# Patient Record
Sex: FEMALE | Race: WHITE | NOT HISPANIC OR LATINO | Employment: UNEMPLOYED | URBAN - METROPOLITAN AREA
[De-identification: names, ages, dates, MRNs, and addresses within clinical notes are randomized per-mention and may not be internally consistent; named-entity substitution may affect disease eponyms.]

---

## 2018-01-29 DIAGNOSIS — K74.60 UNSPECIFIED CIRRHOSIS OF LIVER: Primary | ICD-10-CM

## 2018-01-29 DIAGNOSIS — Z12.31 VISIT FOR SCREENING MAMMOGRAM: ICD-10-CM

## 2018-02-06 ENCOUNTER — HOSPITAL ENCOUNTER (OUTPATIENT)
Dept: RADIOLOGY | Facility: HOSPITAL | Age: 58
Discharge: HOME | End: 2018-02-06
Attending: GENERAL PRACTICE
Payer: MEDICAID

## 2018-02-06 VITALS — HEIGHT: 62 IN | BODY MASS INDEX: 21.35 KG/M2 | WEIGHT: 116 LBS

## 2018-02-06 DIAGNOSIS — K74.60 UNSPECIFIED CIRRHOSIS OF LIVER: ICD-10-CM

## 2018-02-06 DIAGNOSIS — Z12.31 VISIT FOR SCREENING MAMMOGRAM: ICD-10-CM

## 2018-02-06 PROCEDURE — 77067 SCR MAMMO BI INCL CAD: CPT | Mod: TC

## 2018-02-06 PROCEDURE — 76705 ECHO EXAM OF ABDOMEN: CPT | Mod: 26,,, | Performed by: RADIOLOGY

## 2018-02-06 PROCEDURE — 76705 ECHO EXAM OF ABDOMEN: CPT | Mod: TC

## 2018-02-06 PROCEDURE — 77067 SCR MAMMO BI INCL CAD: CPT | Mod: 26,,, | Performed by: RADIOLOGY

## 2018-02-06 PROCEDURE — 77063 BREAST TOMOSYNTHESIS BI: CPT | Mod: 26,,, | Performed by: RADIOLOGY

## 2018-02-21 DIAGNOSIS — J44.9 CHRONIC OBSTRUCTIVE PULMONARY DISEASE (COPD): ICD-10-CM

## 2018-02-21 DIAGNOSIS — M81.0 AGE-RELATED OSTEOPOROSIS WITHOUT CURRENT PATHOLOGICAL FRACTURE: Primary | ICD-10-CM

## 2018-04-24 ENCOUNTER — HOSPITAL ENCOUNTER (OUTPATIENT)
Dept: RADIOLOGY | Facility: HOSPITAL | Age: 58
Discharge: HOME | End: 2018-04-24
Attending: GENERAL PRACTICE
Payer: MEDICAID

## 2018-04-24 ENCOUNTER — HOSPITAL ENCOUNTER (OUTPATIENT)
Dept: RESPIRATORY THERAPY | Facility: HOSPITAL | Age: 58
Discharge: HOME | End: 2018-04-24
Attending: GENERAL PRACTICE
Payer: MEDICAID

## 2018-04-24 DIAGNOSIS — J44.9 CHRONIC OBSTRUCTIVE PULMONARY DISEASE (COPD): ICD-10-CM

## 2018-04-24 DIAGNOSIS — M81.0 AGE-RELATED OSTEOPOROSIS WITHOUT CURRENT PATHOLOGICAL FRACTURE: ICD-10-CM

## 2018-04-24 PROCEDURE — 94729 DIFFUSING CAPACITY: CPT

## 2018-04-24 PROCEDURE — 77080 DXA BONE DENSITY AXIAL: CPT | Mod: 26,,, | Performed by: RADIOLOGY

## 2018-04-24 PROCEDURE — 94010 BREATHING CAPACITY TEST: CPT

## 2018-04-24 PROCEDURE — 94727 GAS DIL/WSHOT DETER LNG VOL: CPT

## 2018-04-24 PROCEDURE — 77080 DXA BONE DENSITY AXIAL: CPT | Mod: TC

## 2018-09-20 ENCOUNTER — OFFICE VISIT (OUTPATIENT)
Dept: OBSTETRICS AND GYNECOLOGY | Facility: CLINIC | Age: 58
End: 2018-09-20
Payer: MEDICAID

## 2018-09-20 VITALS
HEIGHT: 62 IN | WEIGHT: 135.06 LBS | BODY MASS INDEX: 24.85 KG/M2 | SYSTOLIC BLOOD PRESSURE: 110 MMHG | DIASTOLIC BLOOD PRESSURE: 62 MMHG

## 2018-09-20 DIAGNOSIS — J44.9 CHRONIC OBSTRUCTIVE PULMONARY DISEASE, UNSPECIFIED COPD TYPE: ICD-10-CM

## 2018-09-20 DIAGNOSIS — N95.1 MENOPAUSAL STATE: ICD-10-CM

## 2018-09-20 DIAGNOSIS — Z01.419 ENCOUNTER FOR GYNECOLOGICAL EXAMINATION WITHOUT ABNORMAL FINDING: ICD-10-CM

## 2018-09-20 DIAGNOSIS — R87.619 ABNORMAL CERVICAL PAPANICOLAOU SMEAR, UNSPECIFIED ABNORMAL PAP FINDING: ICD-10-CM

## 2018-09-20 DIAGNOSIS — Z00.00 ANNUAL PHYSICAL EXAM: Primary | ICD-10-CM

## 2018-09-20 PROCEDURE — 99213 OFFICE O/P EST LOW 20 MIN: CPT | Mod: PBBFAC | Performed by: OBSTETRICS & GYNECOLOGY

## 2018-09-20 PROCEDURE — 99999 PR PBB SHADOW E&M-EST. PATIENT-LVL III: CPT | Mod: PBBFAC,,, | Performed by: OBSTETRICS & GYNECOLOGY

## 2018-09-20 PROCEDURE — 99386 PREV VISIT NEW AGE 40-64: CPT | Mod: S$PBB,,, | Performed by: OBSTETRICS & GYNECOLOGY

## 2018-09-20 PROCEDURE — 88175 CYTOPATH C/V AUTO FLUID REDO: CPT

## 2018-09-20 NOTE — PATIENT INSTRUCTIONS

## 2018-09-20 NOTE — PROGRESS NOTES
Subjective:      Chief Complaint:  ABN  PAP  Chief Complaint   Patient presents with    Well Woman       Menstrual History:    OB History      Para Term  AB Living    4 4 4          SAB TAB Ectopic Multiple Live Births                       Menarche age: 12     No LMP recorded. Patient is postmenopausal.                Objective:        History of Present Illness AND  Examination detailed DICTATE:    PRESENT ILLNESS AND  NOTE:  The patient is 58 years of age, new patient to   [facility], new patient to me.  The patient is  4, para 4.  Pap smear   in 2018, abnormal.  Bone density, osteoporosis.  Mammogram is negative.  The   patient's medical, abnormal Pap smears, COPD, osteoporosis, cholelithiasis.    Surgery, none.  The patient is also complaining of right lower quadrant pain   with bending and increased activity.  This has been going on for weeks.    PHYSICAL EXAMINATION:  VITAL SIGNS:  Blood pressure 110/62, weight 135.  BREASTS:  There is no mass, lump, discharge, skin changes, retractions.  PELVIC:  External normal.  Vulva normal.  B[place], urethral and [place] glands   are negative.  Vagina is clear.  Cervix is mild ectropion.  Small uterus.  Ovary   is barely palpable.  The patient is not on any estrogen replacement therapy.    Good pelvic support.  No pain elicited on examination.  Pain might be due to the   cholelithiasis.  I did not find any abnormality on pelvic examination.  RECTAL:  Negative.    PLAN:  Repeat Pap smear.  Continue with multivitamin, calcium, vitamin D, and we   will see the patient back after the Pap smear has returned.    IMPRESSION:  Possible pain either due to gallstones or diverticular problem   since on pelvic exam I could not elicit any discomfort or pain and there is no   enlargement.      JAIDEN  dd: 2018 09:18:12 (CDT)  td: 2018 23:29:04 (CDT)  Doc ID   #[id number]  Job ID #[id number]    CC:         Physical Exam   Constitutional: She is  oriented to person, place, and time. She appears well-developed and well-nourished. No distress.   HENT:   Head: Normocephal.  Eyes: Pupils are equal, round, and reactive to light.   Neck: Neck supple. No tracheal deviation present.   Cardiovascular: Normal rate, regular rhythm and normal heart sounds. No murmur heard.  Pulmonary/Chest: COPD  Abdominal: Bowel sounds are normal. She exhibits no distension and no mass. There is no tenderness. There is no rebound and no guarding.  Musculoskeletal: Normal range of motion.   Lymphadenopathy:        Right: No inguinal adenopathy present.        Left: No inguinal adenopathy present.   Skin: Skin is warm. No rash noted.        Review of Systems  Review of Systems   Normal ROS:   Constitutional: Negative for fever, chills, activity change fatigue and unexpected weight change.   HENT: Negative for nosebleeds, congestion.  Eyes: Negative for visual disturbance.   Respiratory: Negative for shortness of breath and wheezing.    Cardiovascular: Negative for chest pain, palpitations.   Gastrointestinal: Negative for abdominal pain, diarrhea, constipation, blood in stool and abdominal distention.   Musculoskeletal: Negative for back pain.   Allergic/Immunologic: Negative.   Neurological: Negative    Hematological: Negative f   Psychiatric/Behavioral: Negative     Assessment:      DiagnosisMENOPAUSAL    ABN   PAP    COPD:       Plan:      Return in 2  weeks

## 2018-09-27 ENCOUNTER — TELEPHONE (OUTPATIENT)
Dept: OBSTETRICS AND GYNECOLOGY | Facility: CLINIC | Age: 58
End: 2018-09-27

## 2018-09-27 NOTE — TELEPHONE ENCOUNTER
9/27/18 @ 1630 (TERESA)  INFORMED PT TO KEEP  HER APPT W/ DR LINDSAY ON 10/4/18...  Elenita Tran I Staff   Caller: Self (Today,  3:16 PM)             Pt is calling to see if she has to come to her appt on 10/04 since her test results came back ok. Please call pt at [phone]

## 2018-10-04 ENCOUNTER — OFFICE VISIT (OUTPATIENT)
Dept: OBSTETRICS AND GYNECOLOGY | Facility: CLINIC | Age: 58
End: 2018-10-04
Payer: MEDICAID

## 2018-10-04 VITALS
BODY MASS INDEX: 24.76 KG/M2 | SYSTOLIC BLOOD PRESSURE: 122 MMHG | HEIGHT: 62 IN | WEIGHT: 134.56 LBS | DIASTOLIC BLOOD PRESSURE: 84 MMHG

## 2018-10-04 DIAGNOSIS — N95.1 MENOPAUSAL STATE: Primary | ICD-10-CM

## 2018-10-04 DIAGNOSIS — K70.30 ALCOHOLIC CIRRHOSIS, UNSPECIFIED WHETHER ASCITES PRESENT: ICD-10-CM

## 2018-10-04 DIAGNOSIS — M81.0 OSTEOPOROSIS, UNSPECIFIED OSTEOPOROSIS TYPE, UNSPECIFIED PATHOLOGICAL FRACTURE PRESENCE: ICD-10-CM

## 2018-10-04 PROCEDURE — 99212 OFFICE O/P EST SF 10 MIN: CPT | Mod: S$PBB,,, | Performed by: OBSTETRICS & GYNECOLOGY

## 2018-10-04 PROCEDURE — 99999 PR PBB SHADOW E&M-EST. PATIENT-LVL III: CPT | Mod: PBBFAC,,, | Performed by: OBSTETRICS & GYNECOLOGY

## 2018-10-04 PROCEDURE — 99213 OFFICE O/P EST LOW 20 MIN: CPT | Mod: PBBFAC | Performed by: OBSTETRICS & GYNECOLOGY

## 2018-10-04 RX ORDER — DULOXETIN HYDROCHLORIDE 60 MG/1
60 CAPSULE, DELAYED RELEASE ORAL DAILY
Refills: 3 | COMMUNITY
Start: 2018-09-27

## 2018-10-04 RX ORDER — FERROUS SULFATE, DRIED 160(50) MG
1 TABLET, EXTENDED RELEASE ORAL 2 TIMES DAILY WITH MEALS
COMMUNITY

## 2018-10-04 RX ORDER — OMEPRAZOLE 20 MG/1
20 CAPSULE, DELAYED RELEASE ORAL EVERY MORNING
Refills: 3 | COMMUNITY
Start: 2018-09-12

## 2018-10-04 RX ORDER — ALENDRONATE SODIUM 10 MG/1
10 TABLET ORAL DAILY
Refills: 3 | COMMUNITY
Start: 2018-09-27

## 2018-10-04 RX ORDER — TRAZODONE HYDROCHLORIDE 50 MG/1
50 TABLET ORAL NIGHTLY
Refills: 3 | COMMUNITY
Start: 2018-09-12

## 2018-10-04 RX ORDER — UMECLIDINIUM BROMIDE AND VILANTEROL TRIFENATATE 62.5; 25 UG/1; UG/1
POWDER RESPIRATORY (INHALATION)
Refills: 5 | COMMUNITY
Start: 2018-09-12

## 2018-10-04 RX ORDER — VITAMIN A 3000 MCG
10000 CAPSULE ORAL DAILY
COMMUNITY

## 2018-10-04 RX ORDER — CYCLOBENZAPRINE HCL 10 MG
10 TABLET ORAL 3 TIMES DAILY PRN
Refills: 0 | COMMUNITY
Start: 2018-09-12

## 2018-10-04 RX ORDER — GABAPENTIN 600 MG/1
600 TABLET ORAL 3 TIMES DAILY
Refills: 3 | COMMUNITY
Start: 2018-09-12

## 2018-10-04 NOTE — PROGRESS NOTES
Subjective:      Chief Complaint:  Menopausal   OSTEOPOROSIS    Menstrual History:    OB History      Para Term  AB Living    4 4 4          SAB TAB Ectopic Multiple Live Births                       Menarche age: 12     No LMP recorded. Patient is postmenopausal.       The patient is 58 years of age,  4, para 4, was here in early September   for routine annual examination.  The patient had a past history of abnormal Pap   smear.  Past problem list, medical history, surgical history reviewed with the   patient.  The patient has gallstones, not on any estrogen replacement therapy.    Repeat Pap smear was negative.  Mammogram negative; however, the patient has   osteoporosis.  Presently taking alendronate.  I would like to add a low-dose   estrogen Premarin 0.3 mg every second day.  Then, repeat the bone density in   about a year.  Discussed the risk of alendronate and estrogen together,   increased clots, possible strokes and heart problems.  However, fracturing the   hip and surgery also carries a great risk.  So, we will start the patient on   Premarin 0.3 mg every other day and repeat bone density in one year.      JAIDEN  dd: 10/04/2018 08:48:51 (CDT)  td: 10/05/2018 00:08:26 (CDT)  Doc ID   #[id number]  Job ID #[id number]    CC:          Objective:        History of Present Illness AND  Examination detailed DICTATE:             Assessment:      Diagnosis: MENOPAUSAL    OSTEOPOROSIS    CIRRHOSIS     Plan:      Return in 3  months

## 2019-01-07 ENCOUNTER — OFFICE VISIT (OUTPATIENT)
Dept: OBSTETRICS AND GYNECOLOGY | Facility: CLINIC | Age: 59
End: 2019-01-07
Payer: MEDICAID

## 2019-01-07 VITALS
DIASTOLIC BLOOD PRESSURE: 80 MMHG | WEIGHT: 139.13 LBS | HEIGHT: 62 IN | BODY MASS INDEX: 25.6 KG/M2 | SYSTOLIC BLOOD PRESSURE: 122 MMHG

## 2019-01-07 DIAGNOSIS — N95.1 MENOPAUSAL STATE: Primary | ICD-10-CM

## 2019-01-07 PROCEDURE — 99212 OFFICE O/P EST SF 10 MIN: CPT | Mod: S$PBB,,, | Performed by: OBSTETRICS & GYNECOLOGY

## 2019-01-07 PROCEDURE — 99213 OFFICE O/P EST LOW 20 MIN: CPT | Mod: PBBFAC | Performed by: OBSTETRICS & GYNECOLOGY

## 2019-01-07 PROCEDURE — 99212 PR OFFICE/OUTPT VISIT, EST, LEVL II, 10-19 MIN: ICD-10-PCS | Mod: S$PBB,,, | Performed by: OBSTETRICS & GYNECOLOGY

## 2019-01-07 PROCEDURE — 99999 PR PBB SHADOW E&M-EST. PATIENT-LVL III: CPT | Mod: PBBFAC,,, | Performed by: OBSTETRICS & GYNECOLOGY

## 2019-01-07 PROCEDURE — 99999 PR PBB SHADOW E&M-EST. PATIENT-LVL III: ICD-10-PCS | Mod: PBBFAC,,, | Performed by: OBSTETRICS & GYNECOLOGY

## 2019-01-07 NOTE — PROGRESS NOTES
Subjective:      Chief Complaint:    Chief Complaint   Patient presents with    Follow-up       Menstrual History:    OB History      Para Term  AB Living    4 4 4          SAB TAB Ectopic Multiple Live Births                       Menarche age: 12     No LMP recorded. Patient is postmenopausal.            Objective:        History of Present Illness AND  Examination detailed DICTATE:     The patient is a 58-year-old lady who was here approximately three weeks ago for   yearly examination.  The patient was referred from her primary care because of   abnormal Pap smear.  She is a  4, para 4.  Past medical history and   problem list reviewed.  The patient had received the benefit of repeat Pap   smear, which was entirely within normal limit.  The patient was reassured that   her Pap smear is normal.  We do not have to be concerned about it.  We will see   her back on her regular visit within a year.      JAIDEN  dd: 2019 14:24:36 (CST)  td: 2019 03:24:46 (CST)  Doc ID   #[id number]  Job ID #[id number]    CC:             Assessment:      Diagnosis:menopausal          Plan:      Return in 12  months

## 2019-04-05 DIAGNOSIS — Z12.39 SCREENING BREAST EXAMINATION: Primary | ICD-10-CM

## 2019-04-15 ENCOUNTER — HOSPITAL ENCOUNTER (OUTPATIENT)
Dept: RADIOLOGY | Facility: HOSPITAL | Age: 59
Discharge: HOME | End: 2019-04-15
Attending: GENERAL PRACTICE
Payer: MEDICAID

## 2019-04-15 DIAGNOSIS — Z12.39 SCREENING BREAST EXAMINATION: ICD-10-CM

## 2019-04-15 PROCEDURE — 77063 MAMMO DIGITAL SCREENING BILAT WITH TOMOSYNTHESIS_CAD: ICD-10-PCS | Mod: 26,,, | Performed by: RADIOLOGY

## 2019-04-15 PROCEDURE — 77067 MAMMO DIGITAL SCREENING BILAT WITH TOMOSYNTHESIS_CAD: ICD-10-PCS | Mod: 26,,, | Performed by: RADIOLOGY

## 2019-04-15 PROCEDURE — 77063 BREAST TOMOSYNTHESIS BI: CPT | Mod: 26,,, | Performed by: RADIOLOGY

## 2019-04-15 PROCEDURE — 77067 SCR MAMMO BI INCL CAD: CPT | Mod: TC

## 2019-04-15 PROCEDURE — 77067 SCR MAMMO BI INCL CAD: CPT | Mod: 26,,, | Performed by: RADIOLOGY

## 2019-11-08 DIAGNOSIS — R19.00 INTRA-ABDOMINAL AND PELVIC SWELLING, MASS AND LUMP, UNSPECIFIED SITE: Primary | ICD-10-CM

## 2019-11-14 ENCOUNTER — HOSPITAL ENCOUNTER (OUTPATIENT)
Dept: RADIOLOGY | Facility: HOSPITAL | Age: 59
Discharge: HOME | End: 2019-11-14
Attending: GENERAL PRACTICE
Payer: MEDICAID

## 2019-11-14 DIAGNOSIS — R19.00 INTRA-ABDOMINAL AND PELVIC SWELLING, MASS AND LUMP, UNSPECIFIED SITE: ICD-10-CM

## 2019-11-14 PROCEDURE — 76705 ECHO EXAM OF ABDOMEN: CPT | Mod: TC

## 2019-11-14 PROCEDURE — 76705 ECHO EXAM OF ABDOMEN: CPT | Mod: 26,,, | Performed by: RADIOLOGY

## 2019-11-14 PROCEDURE — 76705 US ABDOMEN LIMITED: ICD-10-PCS | Mod: 26,,, | Performed by: RADIOLOGY

## 2020-06-02 DIAGNOSIS — Z12.31 ENCOUNTER FOR SCREENING MAMMOGRAM FOR MALIGNANT NEOPLASM OF BREAST: Primary | ICD-10-CM

## 2020-06-11 ENCOUNTER — HOSPITAL ENCOUNTER (OUTPATIENT)
Dept: RADIOLOGY | Facility: HOSPITAL | Age: 60
Discharge: HOME | End: 2020-06-11
Attending: GENERAL PRACTICE
Payer: MEDICAID

## 2020-06-11 VITALS — WEIGHT: 139 LBS | HEIGHT: 62 IN | BODY MASS INDEX: 25.58 KG/M2

## 2020-06-11 DIAGNOSIS — Z12.31 ENCOUNTER FOR SCREENING MAMMOGRAM FOR MALIGNANT NEOPLASM OF BREAST: ICD-10-CM

## 2020-06-24 ENCOUNTER — HOSPITAL ENCOUNTER (OUTPATIENT)
Dept: RADIOLOGY | Facility: HOSPITAL | Age: 60
Discharge: HOME | End: 2020-06-24
Attending: GENERAL PRACTICE
Payer: MEDICAID

## 2020-06-24 VITALS — BODY MASS INDEX: 25.58 KG/M2 | HEIGHT: 62 IN | WEIGHT: 139 LBS

## 2020-06-24 DIAGNOSIS — N64.52 NIPPLE DISCHARGE IN FEMALE: ICD-10-CM

## 2020-06-24 DIAGNOSIS — N64.4 BREAST PAIN IN FEMALE: ICD-10-CM

## 2020-06-24 PROCEDURE — 77066 DX MAMMO INCL CAD BI: CPT | Mod: 26,,, | Performed by: RADIOLOGY

## 2020-06-24 PROCEDURE — 77062 MAMMO DIGITAL DIAGNOSTIC BILAT WITH TOMOSYNTHESIS_CAD: ICD-10-PCS | Mod: 26,,, | Performed by: RADIOLOGY

## 2020-06-24 PROCEDURE — 77066 DX MAMMO INCL CAD BI: CPT | Mod: TC

## 2020-06-24 PROCEDURE — 77062 BREAST TOMOSYNTHESIS BI: CPT | Mod: 26,,, | Performed by: RADIOLOGY

## 2020-06-24 PROCEDURE — 77066 MAMMO DIGITAL DIAGNOSTIC BILAT WITH TOMOSYNTHESIS_CAD: ICD-10-PCS | Mod: 26,,, | Performed by: RADIOLOGY

## 2020-11-19 DIAGNOSIS — K74.60 UNSPECIFIED CIRRHOSIS OF LIVER: ICD-10-CM

## 2020-11-19 DIAGNOSIS — M81.0 AGE-RELATED OSTEOPOROSIS WITHOUT CURRENT PATHOLOGICAL FRACTURE: Primary | ICD-10-CM

## 2020-12-01 ENCOUNTER — HOSPITAL ENCOUNTER (OUTPATIENT)
Dept: RADIOLOGY | Facility: HOSPITAL | Age: 60
Discharge: HOME | End: 2020-12-01
Attending: NURSE PRACTITIONER
Payer: MEDICAID

## 2020-12-01 DIAGNOSIS — K74.60 UNSPECIFIED CIRRHOSIS OF LIVER: ICD-10-CM

## 2020-12-01 PROCEDURE — 76705 US ABDOMEN LIMITED: ICD-10-PCS | Mod: 26,,, | Performed by: RADIOLOGY

## 2020-12-01 PROCEDURE — 76705 ECHO EXAM OF ABDOMEN: CPT | Mod: TC

## 2020-12-01 PROCEDURE — 76705 ECHO EXAM OF ABDOMEN: CPT | Mod: 26,,, | Performed by: RADIOLOGY

## 2020-12-22 ENCOUNTER — HOSPITAL ENCOUNTER (OUTPATIENT)
Dept: RADIOLOGY | Facility: HOSPITAL | Age: 60
Discharge: HOME | End: 2020-12-22
Attending: GENERAL PRACTICE
Payer: MEDICAID

## 2020-12-22 DIAGNOSIS — Z76.89 PERSONS ENCOUNTERING HEALTH SERVICES IN OTHER SPECIFIED CIRCUMSTANCES: ICD-10-CM

## 2020-12-22 PROCEDURE — 71045 X-RAY EXAM CHEST 1 VIEW: CPT | Mod: TC,FY

## 2020-12-22 PROCEDURE — 71045 XR CHEST 1 VIEW: ICD-10-PCS | Mod: 26,,, | Performed by: RADIOLOGY

## 2020-12-22 PROCEDURE — 71045 X-RAY EXAM CHEST 1 VIEW: CPT | Mod: 26,,, | Performed by: RADIOLOGY

## 2022-01-07 DIAGNOSIS — M81.0 AGE-RELATED OSTEOPOROSIS WITHOUT CURRENT PATHOLOGICAL FRACTURE: Primary | ICD-10-CM

## 2022-01-21 ENCOUNTER — HOSPITAL ENCOUNTER (OUTPATIENT)
Dept: RADIOLOGY | Facility: CLINIC | Age: 62
Discharge: HOME | End: 2022-01-21
Attending: GENERAL PRACTICE
Payer: MEDICAID

## 2022-01-21 DIAGNOSIS — M81.0 AGE-RELATED OSTEOPOROSIS WITHOUT CURRENT PATHOLOGICAL FRACTURE: ICD-10-CM

## 2022-01-21 PROCEDURE — 77080 DEXA BONE DENSITY SPINE HIP: ICD-10-PCS | Mod: 26,,, | Performed by: INTERNAL MEDICINE

## 2022-01-21 PROCEDURE — 77080 DXA BONE DENSITY AXIAL: CPT | Mod: 26,,, | Performed by: INTERNAL MEDICINE

## 2022-01-21 PROCEDURE — 77080 DXA BONE DENSITY AXIAL: CPT | Mod: TC,PO

## 2022-02-01 DIAGNOSIS — Z12.31 ENCOUNTER FOR SCREENING MAMMOGRAM FOR MALIGNANT NEOPLASM OF BREAST: Primary | ICD-10-CM

## 2022-02-08 ENCOUNTER — HOSPITAL ENCOUNTER (OUTPATIENT)
Dept: RADIOLOGY | Facility: HOSPITAL | Age: 62
Discharge: HOME | End: 2022-02-08
Attending: NURSE PRACTITIONER
Payer: MEDICAID

## 2022-02-08 VITALS — WEIGHT: 139 LBS | HEIGHT: 62 IN | BODY MASS INDEX: 25.58 KG/M2

## 2022-02-08 DIAGNOSIS — Z12.31 ENCOUNTER FOR SCREENING MAMMOGRAM FOR MALIGNANT NEOPLASM OF BREAST: ICD-10-CM

## 2022-02-08 PROCEDURE — 77067 SCR MAMMO BI INCL CAD: CPT | Mod: TC

## 2022-02-08 PROCEDURE — 77067 MAMMO DIGITAL SCREENING BILAT: ICD-10-PCS | Mod: 26,,, | Performed by: RADIOLOGY

## 2022-02-08 PROCEDURE — 77067 SCR MAMMO BI INCL CAD: CPT | Mod: 26,,, | Performed by: RADIOLOGY

## 2023-01-05 DIAGNOSIS — Z12.31 ENCOUNTER FOR SCREENING MAMMOGRAM FOR MALIGNANT NEOPLASM OF BREAST: Primary | ICD-10-CM

## 2023-01-05 DIAGNOSIS — Z72.0 TOBACCO ABUSE: Primary | ICD-10-CM

## 2023-02-09 ENCOUNTER — HOSPITAL ENCOUNTER (OUTPATIENT)
Dept: RADIOLOGY | Facility: HOSPITAL | Age: 63
Discharge: HOME | End: 2023-02-09
Attending: GENERAL PRACTICE
Payer: MEDICAID

## 2023-02-09 VITALS — BODY MASS INDEX: 25.58 KG/M2 | WEIGHT: 139 LBS | HEIGHT: 62 IN

## 2023-02-09 DIAGNOSIS — Z12.31 ENCOUNTER FOR SCREENING MAMMOGRAM FOR MALIGNANT NEOPLASM OF BREAST: ICD-10-CM

## 2023-02-09 DIAGNOSIS — Z72.0 TOBACCO ABUSE: ICD-10-CM

## 2023-02-09 PROCEDURE — 71271 CT THORAX LUNG CANCER SCR C-: CPT | Mod: TC

## 2023-02-09 PROCEDURE — 71271 CT THORAX LUNG CANCER SCR C-: CPT | Mod: 26,,, | Performed by: INTERNAL MEDICINE

## 2023-02-09 PROCEDURE — 77063 MAMMO DIGITAL SCREENING BILAT WITH TOMO: ICD-10-PCS | Mod: 26,,, | Performed by: RADIOLOGY

## 2023-02-09 PROCEDURE — 77067 SCR MAMMO BI INCL CAD: CPT | Mod: 26,,, | Performed by: RADIOLOGY

## 2023-02-09 PROCEDURE — 71271 CT CHEST LUNG SCREENING LOW DOSE: ICD-10-PCS | Mod: 26,,, | Performed by: INTERNAL MEDICINE

## 2023-02-09 PROCEDURE — 77063 BREAST TOMOSYNTHESIS BI: CPT | Mod: 26,,, | Performed by: RADIOLOGY

## 2023-02-09 PROCEDURE — 77067 SCR MAMMO BI INCL CAD: CPT | Mod: TC

## 2023-02-09 PROCEDURE — 77067 MAMMO DIGITAL SCREENING BILAT WITH TOMO: ICD-10-PCS | Mod: 26,,, | Performed by: RADIOLOGY

## 2023-05-18 DIAGNOSIS — R91.8 OTHER NONSPECIFIC ABNORMAL FINDING OF LUNG FIELD: Primary | ICD-10-CM

## 2023-05-23 ENCOUNTER — HOSPITAL ENCOUNTER (OUTPATIENT)
Dept: RADIOLOGY | Facility: HOSPITAL | Age: 63
Discharge: HOME | End: 2023-05-23
Attending: GENERAL PRACTICE
Payer: MEDICAID

## 2023-05-23 DIAGNOSIS — R91.8 OTHER NONSPECIFIC ABNORMAL FINDING OF LUNG FIELD: ICD-10-CM

## 2023-05-23 PROCEDURE — 71271 CT THORAX LUNG CANCER SCR C-: CPT | Mod: 26,,, | Performed by: RADIOLOGY

## 2023-05-23 PROCEDURE — 71271 CT CHEST LUNG SCREENING LOW DOSE: ICD-10-PCS | Mod: 26,,, | Performed by: RADIOLOGY

## 2023-05-23 PROCEDURE — 71271 CT THORAX LUNG CANCER SCR C-: CPT | Mod: TC

## 2024-02-14 DIAGNOSIS — Z72.0 TOBACCO USE: Primary | ICD-10-CM

## 2024-02-14 DIAGNOSIS — Z12.31 ENCOUNTER FOR SCREENING MAMMOGRAM FOR MALIGNANT NEOPLASM OF BREAST: Primary | ICD-10-CM

## 2024-02-14 DIAGNOSIS — M81.0 SENILE OSTEOPOROSIS: Primary | ICD-10-CM

## 2024-02-20 ENCOUNTER — HOSPITAL ENCOUNTER (OUTPATIENT)
Dept: RADIOLOGY | Facility: HOSPITAL | Age: 64
Discharge: HOME | End: 2024-02-20
Attending: GENERAL PRACTICE
Payer: MEDICARE

## 2024-02-20 VITALS — WEIGHT: 110 LBS | HEIGHT: 62 IN | BODY MASS INDEX: 20.24 KG/M2

## 2024-02-20 DIAGNOSIS — Z72.0 TOBACCO USE: ICD-10-CM

## 2024-02-20 DIAGNOSIS — Z12.31 ENCOUNTER FOR SCREENING MAMMOGRAM FOR MALIGNANT NEOPLASM OF BREAST: ICD-10-CM

## 2024-02-20 PROCEDURE — 77067 SCR MAMMO BI INCL CAD: CPT | Mod: TC

## 2024-02-20 PROCEDURE — 71271 CT THORAX LUNG CANCER SCR C-: CPT | Mod: TC

## 2024-02-20 PROCEDURE — 77063 BREAST TOMOSYNTHESIS BI: CPT | Mod: 26,,, | Performed by: RADIOLOGY

## 2024-02-20 PROCEDURE — 77067 SCR MAMMO BI INCL CAD: CPT | Mod: 26,,, | Performed by: RADIOLOGY

## 2024-02-20 PROCEDURE — 71271 CT THORAX LUNG CANCER SCR C-: CPT | Mod: 26,,, | Performed by: RADIOLOGY

## 2024-03-05 ENCOUNTER — HOSPITAL ENCOUNTER (OUTPATIENT)
Dept: RADIOLOGY | Facility: CLINIC | Age: 64
Discharge: HOME | End: 2024-03-05
Attending: GENERAL PRACTICE
Payer: MEDICARE

## 2024-03-05 DIAGNOSIS — M81.0 SENILE OSTEOPOROSIS: ICD-10-CM

## 2024-03-05 PROCEDURE — 77080 DXA BONE DENSITY AXIAL: CPT | Mod: 26,,, | Performed by: INTERNAL MEDICINE

## 2024-03-05 PROCEDURE — 77080 DXA BONE DENSITY AXIAL: CPT | Mod: TC,PO

## 2025-05-13 DIAGNOSIS — Z87.891 PERSONAL HISTORY OF TOBACCO USE, PRESENTING HAZARDS TO HEALTH: Primary | ICD-10-CM

## 2025-05-13 DIAGNOSIS — R01.1 CARDIAC MURMUR: Primary | ICD-10-CM

## 2025-06-10 ENCOUNTER — HOSPITAL ENCOUNTER (OUTPATIENT)
Dept: RADIOLOGY | Facility: HOSPITAL | Age: 65
Discharge: HOME | End: 2025-06-10
Attending: GENERAL PRACTICE
Payer: MEDICARE

## 2025-06-10 ENCOUNTER — HOSPITAL ENCOUNTER (OUTPATIENT)
Dept: CARDIOLOGY | Facility: HOSPITAL | Age: 65
Discharge: HOME | End: 2025-06-10
Attending: GENERAL PRACTICE
Payer: MEDICARE

## 2025-06-10 VITALS — WEIGHT: 110 LBS | BODY MASS INDEX: 20.24 KG/M2 | HEIGHT: 62 IN

## 2025-06-10 DIAGNOSIS — R01.1 CARDIAC MURMUR: ICD-10-CM

## 2025-06-10 DIAGNOSIS — Z87.891 PERSONAL HISTORY OF TOBACCO USE, PRESENTING HAZARDS TO HEALTH: ICD-10-CM

## 2025-06-10 LAB
AORTIC ROOT ANNULUS: 3 CM
AORTIC SIZE INDEX (SOV): 1.9 CM/M2
AORTIC SIZE INDEX: 1.9 CM/M2
AORTIC VALVE CUSP SEPERATION: 1.32 CM
ASCENDING AORTA: 2.8 CM
AV INDEX (PROSTH): 0.5
AV MEAN GRADIENT: 23 MMHG
AV PEAK GRADIENT: 38 MMHG
AV REGURGITATION PRESSURE HALF TIME: 411 MS
AV VALVE AREA BY VELOCITY RATIO: 1.8 CM²
AV VALVE AREA: 1.7 CM²
AV VELOCITY RATIO: 0.52
BSA FOR ECHO PROCEDURE: 1.48 M2
CV ECHO LV RWT: 0.67 CM
DOP CALC AO PEAK VEL: 3.1 M/S
DOP CALC AO VTI: 70.8 CM
DOP CALC LVOT AREA: 3.5 CM2
DOP CALC LVOT DIAMETER: 2.1 CM
DOP CALC LVOT PEAK VEL: 1.6 M/S
DOP CALC LVOT STROKE VOLUME: 123.6 CM3
DOP CALC MV VTI: 49.2 CM
DOP CALCLVOT PEAK VEL VTI: 35.7 CM
E WAVE DECELERATION TIME: 294 MSEC
E/A RATIO: 0.79
E/E' RATIO: 17 M/S
ECHO LV POSTERIOR WALL: 1 CM (ref 0.6–1.1)
FRACTIONAL SHORTENING: 30 % (ref 28–44)
INTERVENTRICULAR SEPTUM: 1.1 CM (ref 0.6–1.1)
IVC DIAMETER: 1.88 CM
IVRT: 137 MSEC
LA MAJOR: 5 CM
LA MINOR: 4.9 CM
LA WIDTH: 3.6 CM
LEFT ATRIUM SIZE: 3.1 CM
LEFT ATRIUM VOLUME INDEX: 32 ML/M2
LEFT ATRIUM VOLUME: 47 CM3
LEFT INTERNAL DIMENSION IN SYSTOLE: 2.1 CM (ref 2.1–4)
LEFT VENTRICLE DIASTOLIC VOLUME INDEX: 22.97 ML/M2
LEFT VENTRICLE DIASTOLIC VOLUME: 34 ML
LEFT VENTRICLE MASS INDEX: 59.8 G/M2
LEFT VENTRICLE SYSTOLIC VOLUME INDEX: 9.5 ML/M2
LEFT VENTRICLE SYSTOLIC VOLUME: 14 ML
LEFT VENTRICULAR INTERNAL DIMENSION IN DIASTOLE: 3 CM (ref 3.5–6)
LEFT VENTRICULAR MASS: 88.5 G
LV LATERAL E/E' RATIO: 15 M/S
LV SEPTAL E/E' RATIO: 20 M/S
LVED V (TEICH): 33.62 ML
LVES V (TEICH): 14.46 ML
LVOT MG: 5.18 MMHG
LVOT MV: 1.08 CM/S
MV MEAN GRADIENT: 5 MMHG
MV PEAK A VEL: 1.51 M/S
MV PEAK E VEL: 1.2 M/S
MV PEAK GRADIENT: 12 MMHG
MV STENOSIS PRESSURE HALF TIME: 85.38 MS
MV VALVE AREA BY CONTINUITY EQUATION: 2.51 CM2
MV VALVE AREA P 1/2 METHOD: 2.58 CM2
OHS CV RV/LV RATIO: 1.07 CM
PISA AR MAX VEL: 4.71 M/S
PISA TR MAX VEL: 2.9 M/S
PULM VEIN S/D RATIO: 1.3
PV MV: 1 M/S
PV PEAK D VEL: 0.61 M/S
PV PEAK GRADIENT: 7 MMHG
PV PEAK S VEL: 0.79 M/S
PV PEAK VELOCITY: 1.33 M/S
RA MAJOR: 3.72 CM
RA PRESSURE ESTIMATED: 3 MMHG
RA WIDTH: 3.5 CM
RIGHT VENTRICLE DIASTOLIC BASEL DIMENSION: 3.2 CM
RIGHT VENTRICULAR END-DIASTOLIC DIMENSION: 3.2 CM
RV TB RVSP: 6 MMHG
RV TISSUE DOPPLER FREE WALL SYSTOLIC VELOCITY 1 (APICAL 4 CHAMBER VIEW): 14.48 CM/S
SINUS: 2.75 CM
STJ: 2 CM
TDI LATERAL: 0.08 M/S
TDI SEPTAL: 0.06 M/S
TDI: 0.07 M/S
TR MAX PG: 33 MMHG
TRICUSPID ANNULAR PLANE SYSTOLIC EXCURSION: 2.5 CM
TV REST PULMONARY ARTERY PRESSURE: 37 MMHG
Z-SCORE OF LEFT VENTRICULAR DIMENSION IN END DIASTOLE: -3.86
Z-SCORE OF LEFT VENTRICULAR DIMENSION IN END SYSTOLE: -2.1

## 2025-06-10 PROCEDURE — 93306 TTE W/DOPPLER COMPLETE: CPT | Mod: 26,,, | Performed by: INTERNAL MEDICINE

## 2025-06-10 PROCEDURE — 93306 TTE W/DOPPLER COMPLETE: CPT

## 2025-06-10 PROCEDURE — 71271 CT THORAX LUNG CANCER SCR C-: CPT | Mod: 26,,, | Performed by: RADIOLOGY

## 2025-06-10 PROCEDURE — 71271 CT THORAX LUNG CANCER SCR C-: CPT | Mod: TC
